# Patient Record
(demographics unavailable — no encounter records)

---

## 2025-02-04 NOTE — ASSESSMENT
[FreeTextEntry1] : 76-year-old male currently medically stable with controlled hypertension on losartan HCT and hyperlipidemia on atorvastatin. Continue present treatment. Patient also has prediabetes.  6+ months of mild edema which worsens as the day goes on likely not cardiac but should be ruled out therefore referral given to see cardiology. History of CAT scan and showed coronary atherosclerosis. Cardiac evaluation finding no issues.  Stool fit test was done about 5 years ago which was positive with patient told he needs follow-up colonoscopy and he was scheduled and cleared for colonoscopy August 2024 but never had it done.  Declining to do colonoscopy and request follow-up stool FIT test therefore given  Significant eye issues with status post bilateral cataract surgery September 2021 and left corneal transplant February 2022. Followup with ophthalmology as scheduled.  The patient is status post nephrolithiasis with calcium oxalate stone therefore encouraged plenty of water daily with avoidance of foods high in calcium oxalate.  Patient encouraged to continue good diet but at exercise on a regular basis.  Influenza vaccine received Received COVID vaccine  Received pneumococcal vaccines Received Zostavax Tdap August 2013 therefore recommended follow-up but declines  Venipuncture done today in the office Followup in 6 months

## 2025-02-04 NOTE — PHYSICAL EXAM
[FreeTextEntry1] : Postnasal drip [Prostate Nodule] : did not have a nodule [Prostate Tenderness] : was not tender [Prostate Fluctuant] : was not fluctuant

## 2025-02-04 NOTE — HISTORY OF PRESENT ILLNESS
[FreeTextEntry1] : 76-year-old male with history of hyperlipidemia on Lipitor and hypertension on losartan HCT otherwise with good general health presents for his yearly physical History also includes prediabetes  July 2024 the patient blood pressure was elevated with adjustment of medications with improved blood pressure.  2018 he had an issue with renal stones collecting one in finding its composition of calcium oxalate. The patient is aware to drink plenty of water daily and avoid foods high in calcium oxalate.  June 2019 cardiac PET scan negative and carotid duplex showing plaque without stenosis.  Stress echocardiogram December 2020 normal with aortic sclerosis without stenosis.  It also showed a questionable abnormality in the rectum and he followed up with gastroenterology in 2019 with colonoscopy without abnormal findings with followup in 5 years.  Patient generally is feeling well without complaints including no chest pain, palpitations, shortness of breath. He does state for 6+ months he has noticed swelling of his ankles minimal when he wakes up in the morning but increases as the day goes on.  No associated cardiac symptoms.  Stool fit test was done about 5 years ago which was positive with patient told he needs follow-up colonoscopy and he was scheduled and cleared for colonoscopy August 2024 but never had it done.  Declining to do colonoscopy and request follow-up stool FIT tests.  Issues with his eyes when he had bilateral cataract surgery September 2021 and a left corneal transplant February 2022.  Status post excision of a mass on his right hand July 2023 = ganglion  His history otherwise is negative without any other chronic medical issues. He does have a family history of ASHD.  He is  with 3 children

## 2025-02-04 NOTE — HEALTH RISK ASSESSMENT
[YES] : Yes [Are there any children in your household?] : There are children in the household. [Have you attended a firearm safety workshop or class?] : A firearm safety workshop or class has been attended. [de-identified] : occ [Audit-CScore] : 2 [de-identified] : occ [de-identified] : good  [[id number]] : 0 [Are there any unlocked firearms stored in your household?] : No unlocked firearms in the household. [Are there any firearms stored in your household that are loaded?] : No firearms are stored in the household loaded. [Has anyone in the household been feeling low/depressed/been struggling?] : No one in the household has been feeling low/depressed/been struggling. [Change in mental status noted] : No change in mental status noted [Reports changes in hearing] : Reports no changes in hearing [Reports changes in vision] : Reports no changes in vision [Reports changes in dental health] : Reports no changes in dental health [AdvancecareDate] : 02/25